# Patient Record
Sex: MALE | Race: WHITE
[De-identification: names, ages, dates, MRNs, and addresses within clinical notes are randomized per-mention and may not be internally consistent; named-entity substitution may affect disease eponyms.]

---

## 2017-02-06 NOTE — DIAGNOSTIC IMAGING REPORT
[facility]

[id number] [facility] [address] [address]

 

 

 

 

Report Submission Date: 2017 3:34:03 PM CST

Patient       Study

Name:   LION CASTILLO       Date:   2017 3:11:22 PM CST

MRN:   [id number]       Modality Type:   CT\SR

Gender:   M       Description:   CT BRAIN W/O CONTRAST

:   [birth year]       Institution:   [facility]

Physician:   MACKENZIE BROOKS            

 

 

Computed tomography of the head without contrast 



HISTORY:   

Gait disturbance 



FINDINGS:   

Transverse brain sections are obtained without contrast revealing marked 
cerebellar and mild to moderate cerebral atrophy.  Localized left parietal and 
marked bifrontal encephalomalacia is observed.  There is no intracranial 
hemorrhage.  The skull is intact. 



IMPRESSION:   



Marked bifrontal and mild-to-moderate left parietal encephalomalacia.   



Global brain atrophy, most severely involving the cerebellum.

 

Electronically signed on 2017 3:34:03 PM CST by:

Liang BARNARD

## 2017-02-06 NOTE — INPATIENT PROGRESS NOTE
Subjective





- Required Recertification Statement


I anticipate X number of days because-include discharge plan: 1 day





- Review of Systems


Events since last encounter: 





Patient is less lethergic then earlier today. Is still having a lot of apraxic 

gait disturbance. No focal weakness noted. Patient does not complain of any 

pain in the leg or hip area. Ability to get a history from patient is 

difficult. 


HEENT: Denies: Head Aches


Pulmonary: Denies: Dyspnea, Cough


Cardiovascular: Denies: Chest Pain, Palpitations


Gastrointestinal: Denies: Nausea, Vomiting, Abdominal Pain, Diarrhea, 

Constipation


Musculoskeletal: Denies: Back Pain


Neurological: Denies: Weakness





Objective





- Exam


Vitals and I&O: 


 Vital Signs











Temp  97.4 F L  02/06/17 04:20


 


Pulse  76   02/06/17 04:20


 


Resp  16   02/06/17 04:20


 


BP  102/64   02/06/17 04:20


 


Pulse Ox  93   02/06/17 04:20











 Intake & Output











 02/05/17 02/06/17 02/06/17





 23:59 11:59 23:59


 


Intake Total  300 


 


Balance  300 


 


Weight  76.204 kg 76.204 kg


 


Intake:   


 


  IV  300 


 


    Left Hand  300 














General: Alert, Oriented to Person, Cooperative.  No: Oriented to Place, 

Oriented to Time


Neck: Supple, No JVD


Lungs: Clear to auscultation, Normal air movement, Speaks full Sentences.  No: 

Respiratory Distress, Wheezes, Rales, Rhonchi


Cardiovascular: Regular rate, Normal S1, Normal S2, No murmurs


Abdomen: Normal bowel sounds, Soft, No tenderness, No hepatospenomegaly, No 

masses


Extremities: No clubbing, No cyanosis, No edema


Skin: Normal, Pink, Warm, Dry


Neurological: Normal speech (at baseline), Strength Equal Bilat, Normal tone, 

Cranial nerves 3-12 NL.  No: Normal gait


Psych/Mental Status: Mental status NL (at baseline.), Mood NL.  No: Intact 

Judgment





- Results


Results: 


 Laboratory Results











WBC  9.66 K/ul (4.00-12.00)   02/06/17  01:40    


 


RBC  4.60 M/ul (3.90-5.20)   02/06/17  01:40    


 


Hgb  15.0 g/dL (12.0-18.0)   02/06/17  01:40    


 


Hct  44.6 % (37.0-53.0)   02/06/17  01:40    


 


MCV  97.0 fl (80.0-100.0)   02/06/17  01:40    


 


MCH  32.6 pg (28.0-34.0)   02/06/17  01:40    


 


MCHC  33.6 g/dL (30.0-36.0)   02/06/17  01:40    


 


RDW  12.5 % (11.3-14.3)   02/06/17  01:40    


 


Plt Count  230 K/mm3 (130-400)   02/06/17  01:40    


 


Neut % (Auto)  82.4 % (39.0-79.0)  H  02/06/17  01:40    


 


Lymph % (Auto)  9.4 % (16.0-50.0)  L  02/06/17  01:40    


 


Mono % (Auto)  7.3 % (0.0-11.0)   02/06/17  01:40    


 


Eos % (Auto)  0.4 % (0.0-6.8)   02/06/17  01:40    


 


Baso % (Auto)  0.1  (0.0-1.5)   02/06/17  01:40    


 


Neut #  8.0 # k/uL (1.4-7.7)  H  02/06/17  01:40    


 


Lymph #  0.9 # k/uL (0.6-4.0)   02/06/17  01:40    


 


Mono #  0.7 # k/uL (0.0-0.9)   02/06/17  01:40    


 


Eos #  0.0 # k/uL (0.0-0.6)   02/06/17  01:40    


 


Baso #  0.0 # k/uL (0.0-0.5)   02/06/17  01:40    


 


Reactive Lymphs %  0.5 % (0.0-5.0)   02/06/17  01:40    


 


Reactive Lymphs #  0.0 # k/uL (0.0-0.8)   02/06/17  01:40    


 


Sodium  139 mmol/L (136-145)   02/06/17  01:40    


 


Potassium  4.9 mmol/L (3.5-5.0)   02/06/17  01:40    


 


Chloride  95 mmol/L ()  L  02/06/17  01:40    


 


Carbon Dioxide  30 mmol/L (20-32)   02/06/17  01:40    


 


BUN  16 mg/dL (10-26)   02/06/17  01:40    


 


Creatinine  1.4 mg/dL (0.4-1.5)   02/06/17  01:40    


 


Estimated Creat Clear  69   02/06/17  01:40    


 


Est GFR ( Amer)  > 60  (60-)  02/06/17  01:40    


 


Est GFR (Non-Af Amer)  56  (60-) L  02/06/17  01:40    


 


Glucose  119 mg/dL (70-99)  H  02/06/17  01:40    


 


Calcium  9.6 mg/dL (8.5-10.5)   02/06/17  01:40    


 


Total Bilirubin  0.4 mg/dL (0.2-1.2)   02/06/17  01:40    


 


AST  28 U/L (0-41)   02/06/17  01:40    


 


ALT  26 U/L (0-45)   02/06/17  01:40    


 


Alkaline Phosphatase  49 U/L ()   02/06/17  01:40    


 


Total Protein  7.1 g/dL (6.0-8.5)   02/06/17  01:40    


 


Albumin  4.2 g/dL (3.0-5.5)   02/06/17  01:40    


 


Urine Color  Yellow  (YELLOW)   02/06/17  01:30    


 


Urine Appearance  Clear  (CLEAR)   02/06/17  01:30    


 


Urine pH  6.0  (5.0 - 8.0)   02/06/17  01:30    


 


Ur Specific Gravity  1.010  (1.010-1.030)   02/06/17  01:30    


 


Urine Protein  Negative mg/dL (NEGATIVE)   02/06/17  01:30    


 


Urine Ketones  Negative mg/dL (NEGATIVE)   02/06/17  01:30    


 


Urine Occult Blood  Negative  (NEGATIVE)   02/06/17  01:30    


 


Urine Nitrite  Negative  (NEGATIVE)   02/06/17  01:30    


 


Urine Bilirubin  Negative  (NEGATIVE)   02/06/17  01:30    


 


Urine Urobilinogen  0.2 Eu (0.2-1.0)   02/06/17  01:30    


 


Ur Leukocyte Esterase  Negative  (NEGATIVE)   02/06/17  01:30    


 


Urine Glucose  Negative mg/dL (NEGATIVE)   02/06/17  01:30    


 


Influenza A (Rapid)  Negative  (NEGATIVE)   02/06/17  00:01    


 


Influenza B (Rapid)  Negative  (NEGATIVE)   02/06/17  00:01    


 


Group A Strep Screen  Positive  (NEGATIVE)  H  02/06/17  01:30    

















Assessment/Plan





- Assessment/Plan


(1) Apraxic gait


Status: Acute   


Assessment: 


will get CT scan of the brain to check for intracranial abnormalities due to 

recent falls








(2) Seizure disorder


Status: Acute   


Assessment: 


Has had two seizures in the past 36 hours, none since admission. Will get 

valproic acid level








(3) Strep pharyngitis


Status: Acute   Comment: under treatment.   





(4) Dehydration


Status: Acute   


Assessment: 


will recheck lytes.

## 2017-02-06 NOTE — ED PHYSICIAN DOCUMENTATION
General Adult





- HISTORIAN


Historian: patient, parent





- HPI


Stated Complaint: slurred speech, unsteady gait


Chief Complaint: General Adult


Onset: hours


Timing: still present


Severity: moderate


Further Comments: yes (Pt is a 55 yo male with MR who lives in a group home and 

has a seizure d/o.  Pt has daily seizures which are usually brief.  Today pt 

had seizure which lasted longer than usual and appeared very weak.  Pt has not 

been eating and has been sleeping excessively and does not appear to have his 

usual energy.  He has chronic difficulty walking, but he appears more unsteady.

  Pt had complained of sore neck.)





- ROS


CONST: other (pt cannot give good ROS, he appears weak, per family, and had 

complained of a sore neck.)





- PAST HX


Past History: other (MR/alice. delay; seizure d/o; chronic difficulty walking.)


Allergies/Adverse Reactions: 


 Allergies











Allergy/AdvReac Type Severity Reaction Status Date / Time


 


phenobarbital Allergy   Verified 02/06/17 02:34














Home Medications: 


 Ambulatory Orders











 Medication  Instructions  Recorded


 


Cholecalciferol (Vitamin D3) [D3 2,000 unit PO D 03/27/13





Dots]  


 


Acetaminophen [Tylenol] 650 mg PO Q4 PRN #60 tablet 02/17/14


 


Divalproex Sodium [Depakote ER] 250 mg PO BID 02/06/17


 


Lacosamide [Vimpat] 200 mg PO BID 02/06/17


 


Lorazepam [Ativan] 2 mg PO QDAY 02/06/17


 


Oxcarbazepine [Trileptal] 300 mg PO TID 02/06/17














- SOCIAL HX


Smoking History: non-smoker





- FAMILY HX


Family History: No





- VITAL SIGNS


Vital Signs: 





 Vital Signs











Temp Pulse Resp BP Pulse Ox


 


    69   18   75/45   92 


 


    02/06/17 00:30  02/06/17 00:30  02/06/17 00:30  02/06/17 00:30














- REVIEWED ASSESSMENTS


Nursing Assessment  Reviewed: Yes


Vitals Reviewed: Yes





Progress





- Progress


Progress: 


BP 85/45





NS 1 L IVF x 2





/56





Rapid Strep - pos





Rocephin 500 mg IV





Admit to Dr. Costa.








ED Results Lab/Radiology





- Lab Results


Lab Results: 





 Lab Results











  02/06/17 02/06/17 02/06/17





  01:40 01:40 00:01


 


WBC    9.66 K/ul K/ul  





    (4.00-12.00)  


 


RBC    4.60 M/ul M/ul  





    (3.90-5.20)  


 


Hgb    15.0 g/dL g/dL  





    (12.0-18.0)  


 


Hct    44.6 % %  





    (37.0-53.0)  


 


MCV    97.0 fl fl  





    (80.0-100.0)  


 


MCH    32.6 pg pg  





    (28.0-34.0)  


 


MCHC    33.6 g/dL g/dL  





    (30.0-36.0)  


 


RDW    12.5 % %  





    (11.3-14.3)  


 


Plt Count    230 K/mm3 K/mm3  





    (130-400)  


 


Neut % (Auto)    82.4 % H %  





    (39.0-79.0)  


 


Lymph % (Auto)    9.4 % L %  





    (16.0-50.0)  


 


Mono % (Auto)    7.3 % %  





    (0.0-11.0)  


 


Eos % (Auto)    0.4 % %  





    (0.0-6.8)  


 


Baso % (Auto)    0.1   





    (0.0-1.5)  


 


Neut #    8.0 # k/uL H # k/uL  





    (1.4-7.7)  


 


Lymph #    0.9 # k/uL # k/uL  





    (0.6-4.0)  


 


Mono #    0.7 # k/uL # k/uL  





    (0.0-0.9)  


 


Eos #    0.0 # k/uL # k/uL  





    (0.0-0.6)  


 


Baso #    0.0 # k/uL # k/uL  





    (0.0-0.5)  


 


Reactive Lymphs %    0.5 % %  





    (0.0-5.0)  


 


Reactive Lymphs #    0.0 # k/uL # k/uL  





    (0.0-0.8)  


 


Sodium  139 mmol/L mmol/L    





   (136-145)   


 


Potassium  4.9 mmol/L mmol/L    





   (3.5-5.0)   


 


Chloride  95 mmol/L L mmol/L    





   ()   


 


Carbon Dioxide  30 mmol/L mmol/L    





   (20-32)   


 


BUN  16 mg/dL mg/dL    





   (10-26)   


 


Creatinine  1.4 mg/dL mg/dL    





   (0.4-1.5)   


 


Estimated Creat Clear  69     





    


 


Est GFR ( Amer)  > 60     





  (60 - )  


 


Est GFR (Non-Af Amer)  56  L     





  (60 - )  


 


Glucose  119 mg/dL H mg/dL    





   (70-99)   


 


Calcium  9.6 mg/dL mg/dL    





   (8.5-10.5)   


 


Total Bilirubin  0.4 mg/dL mg/dL    





   (0.2-1.2)   


 


AST  28 U/L U/L    





   (0-41)   


 


ALT  26 U/L U/L    





   (0-45)   


 


Alkaline Phosphatase  49 U/L U/L    





   ()   


 


Total Protein  7.1 g/dL g/dL    





   (6.0-8.5)   


 


Albumin  4.2 g/dL g/dL    





   (3.0-5.5)   


 


Influenza A (Rapid)      Negative 





     (NEGATIVE) 


 


Influenza B (Rapid)      Negative 





     (NEGATIVE) 














- Orders


Orders: 





 ED Orders











 Category Date Time Status


 


 BLOOD CULTURE Stat Lab  02/06/17 Ordered


 


 CBC/PLATELET/DIFF Routine Lab  02/06/17 01:40 Completed


 


 CMP Routine Lab  02/06/17 01:40 Completed


 


 INFLUENZA A&B Routine Lab  02/06/17 00:01 Completed


 


 INFLUENZA A&B Stat Lab  02/06/17 00:01 Ordered


 


 Rapid Strep [GRP A STREP SCREEN] Stat Lab  02/06/17 Ordered


 


 UA [URINALYSIS] Routine Lab  02/06/17 Ordered


 


 0.9 % Sodium Chloride [Normal Saline] 1,000 ml Med  02/06/17 00:48 Discontinued





 IV Q1H   














General Adult Physical Exam





- PHYSICAL EXAM


GENERAL APPEARANCE: mild distress


EENT: ENT inspection normal


NECK: normal inspection, supple, lymphadenopathy


RESPIRATORY: no resp distress, chest non-tender, breath sounds normal


CVS: reg rate & rhythm, heart sounds normal


ABDOMEN: soft, no organomegaly, normal bowel sounds


BACK: normal inspection


SKIN: warm/dry, normal color


EXTREMITIES: non-tender, no evidence of injury


NEURO: other (baseline mental status)





Discharge


Clincal Impression: 


 Strep pharyngitis, Seizure disorder





Hypotension


Qualifiers:


 Hypotension type: unspecified hypotension type Qualified Code(s): I95.9 - 

Hypotension, unspecified





Home Medications: 


Ambulatory Orders





Cholecalciferol (Vitamin D3) [D3 Dots] 2,000 unit PO D 03/27/13 


Acetaminophen [Tylenol] 650 mg PO Q4 PRN #60 tablet 02/17/14 


Divalproex Sodium [Depakote ER] 250 mg PO BID 02/06/17 


Lacosamide [Vimpat] 200 mg PO BID 02/06/17 


Lorazepam [Ativan] 2 mg PO QDAY 02/06/17 


Oxcarbazepine [Trileptal] 300 mg PO TID 02/06/17 








Condition: Stable


Disposition: 09 ADMITTED AS INPATIENT


Decision to Admit: [id number]


Decision Time: 03:00

## 2017-02-09 NOTE — DIAGNOSTIC IMAGING REPORT
TODD MANN 

[facility]

[address] [facility].[address]. [address]

 

 

 

 

Report Submission Date: 2017 3:14:21 PM CST

Patient       Study

Name:   LION CASTILLO       Date:   2017 2:40:50 PM CST

MRN:   [id number]       Modality Type:   CR

Gender:   M       Description:   LOWER EXTREMITY

:   [birth year]       Institution:   [facility]

Physician:   TODD MANN

         

 

 

3 views of the right foot 



History: RT FOOT, PAIN/ SWELLING X4-5 DAYS 

Findings: Comparison: 2014 



No evidence of acute fracture or dislocation right foot. 

Deformity of the distal fibula is seen related to prior fracture. Mild hallux 
valgus deformity seen 

There is mild soft tissue swelling and at the right ankle. 



Impression: 

No evidence of acute fracture or dislocation of the right foot. Mild soft 
tissue swelling at the right ankle 

Deformity distal fibula due to old fracture 

Mild hallux valgus deformity

 

Electronically signed on 2017 3:14:21 PM CST by:

Taty BARNARD

## 2017-03-27 NOTE — DISCHARGE SUMMARY
Discharge Summary





- Discharge Sumary


History of Present Illness: 





Patient is a 56-year-old white male who is living in a group home related to 

his mental retardation.  Patient does have a history of seizure disorder.  On 

the day of admission patient did have a seizure that lasted longer than his 

usual seizures.  Patient was subsequently brought to the ED for evaluation.  In 

the emergency room patient was found to have a strep pharyngitis.  Patient was 

noted to be hypotensive with a systolic in the 80s.  It was felt that the 

patient was probably dehydrated.  Patient was subsequently admitted to 

observation for further care and evaluation.


Home Medications: 


 Ambulatory Orders











 Medication  Instructions  Recorded


 


Amoxicillin [Amoxil] 500 mg PO TID #30 capsule 02/06/17


 


Cholecalciferol [Vitamin D-3] 2,000 unit PO 0800 02/06/17


 


Citalopram Hydrobromide [Celexa] 20 mg PO HS 02/06/17


 


Divalproex Sodium [Depakote ER] 250 mg PO 14 02/06/17


 


Divalproex Sodium [Depakote ER] 250 mg PO 20 02/06/17


 


Divalproex Sodium [Depakote ER] 500 mg PO 0800 02/06/17


 


LORazepam [Ativan] 1 mg PO 0800 02/06/17


 


LORazepam [Ativan] 2 mg PO 14 02/06/17


 


LORazepam [Ativan] 2 mg PO 1400  tablet 02/06/17


 


Lacosamide [Vimpat] 200 mg PO BID 02/06/17


 


Multivitamin [Tab-A-Jordan] 1 each PO DAILY 02/06/17


 


Oxcarbazepine [Trileptal] 300 mg PO TID 02/06/17











Consultations this Visit: None


Procedures this Visit: Other (Head CT)


Allergies/Adverse Reactions: 


 Allergies











Allergy/AdvReac Type Severity Reaction Status Date / Time


 


phenobarbital Allergy   Verified 02/06/17 02:34











Discharge Summary: 





Patient remained stable during his hospital stay.  Patient did not have any 

further seizures.  Patient was started on IV fluids of normal saline and 

rehydrated.  Patient's hypotension improved with rehydration.  At the time of 

dismissal patient blood pressure was 122/60.  Patient continued to have a 

somewhat apraxic gait.  CT scan of the head was done and did not show any acute 

abnormalities.  At the time of dismissal patient was stable and was felt that 

he could be followed up on an outpatient basis and patient was subsequently 

discharged in stable condition.





- Final Diagnosis


(1) Apraxic gait


Problems: 


improved








(2) Seizure disorder


Problems: 


stable, continue home meds








(3) Strep pharyngitis


Problems: 


treat with antibiotics








(4) Dehydration


Problems: 


encourage fluids

## 2018-05-27 ENCOUNTER — HOSPITAL ENCOUNTER (EMERGENCY)
Dept: HOSPITAL 44 - ED | Age: 58
Discharge: HOME | End: 2018-05-27
Payer: COMMERCIAL

## 2018-05-27 VITALS
SYSTOLIC BLOOD PRESSURE: 123 MMHG | DIASTOLIC BLOOD PRESSURE: 67 MMHG | SYSTOLIC BLOOD PRESSURE: 123 MMHG | DIASTOLIC BLOOD PRESSURE: 67 MMHG
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
## 2018-05-27 DIAGNOSIS — S01.81XA: Primary | ICD-10-CM
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
## 2018-05-27 PROCEDURE — 12011 RPR F/E/E/N/L/M 2.5 CM/<: CPT

## 2018-05-27 NOTE — ED PHYSICIAN DOCUMENTATION
General Adult





- HISTORIAN


Historian: parent (Mom and Dad)





- HPI


Stated Complaint: LAC


Chief Complaint: Laceration/Recheck/Suture


Additional Information: 





Per mom and dad patient has a chronic history of seizures in which he is at an 

assisted living facility- patient also wears protective head gear d/t to 

frequent seizures.  Mom states that patient spends every other weekend with 

them.  This morning around 10 a.m. they heard noise coming from patients room 

and he was having a seizure and hit his head (he was sleeping so he did not 

have helmet on)- mom states that she cleaned it up and steri-stripped it.  They 

decided to have it looked at just it case it needed stitches.  Parents state 

his seizure was typical and not abnormal.


Onset: hours (around 10 a.m.)


Timing: better


Severity: other (no acute distress- patient is pleasant)


Modifying Factors: seizure


Quality: seizure- normal characteristic for patient





- ROS


CONST: no problems


EYES/ENT: none


CVS/RESP: none


GI/: none


MS/SKIN/LYMPH: other (2 cm laceration to the right brow- superficial)


NEURO/PSYCH: denies: headache, difficulty walking





- PAST HX


Past History: none


Other History: seizure disorder


Surgeries/Procedures: other (vagus nerve stimulator)


Immunizations: tetanus, UTD


Allergies/Adverse Reactions: 


 Allergies











Allergy/AdvReac Type Severity Reaction Status Date / Time


 


phenobarbital Allergy   Verified 05/27/18 15:37














Home Medications: 


 Ambulatory Orders











 Medication  Instructions  Recorded


 


Amoxicillin [Amoxil] 500 mg PO TID #30 capsule 02/06/17


 


Cholecalciferol [Vitamin D-3] 2,000 unit PO 0800 02/06/17


 


Citalopram Hydrobromide [Celexa] 20 mg PO HS 02/06/17


 


Divalproex Sodium [Depakote ER] 250 mg PO 14 02/06/17


 


Divalproex Sodium [Depakote ER] 250 mg PO 20 02/06/17


 


Divalproex Sodium [Depakote ER] 500 mg PO 0800 02/06/17


 


LORazepam [Ativan] 1 mg PO 0800 02/06/17


 


LORazepam [Ativan] 2 mg PO 14 02/06/17


 


LORazepam [Ativan] 2 mg PO 1400  tablet 02/06/17


 


Lacosamide [Vimpat] 200 mg PO BID 02/06/17


 


Multivitamin [Tab-A-Jordan] 1 each PO DAILY 02/06/17


 


Oxcarbazepine [Trileptal] 300 mg PO TID 02/06/17














- SOCIAL HX


Smoking History: non-smoker


Alcohol Use: none


Drug Use: none





- FAMILY HX


Family History: No





- VITAL SIGNS


Vital Signs: 





 Vital Signs











Temp Pulse Resp BP Pulse Ox


 


 97.8 F   87   20   123/67   100 


 


 05/27/18 15:32  05/27/18 15:32  05/27/18 15:32  05/27/18 15:32  05/27/18 15:32














- REVIEWED ASSESSMENTS


Nursing Assessment  Reviewed: Yes


Vitals Reviewed: Yes





Procedures


Wound Location: face


Wound Length: 2cm


Wound's Depth, Shape: superficial


Wound Explored: no foreign body removed


Betadine Prep?: Yes (and chlorhexidine)


Wound Repaired With: Dermabond





ED Results Lab/Radiology





- Orders


Orders: 





 ED Orders











 Category Date Time Status


 


 Skin Adhesive NOW Care  05/27/18 15:45 Ordered














General Adult Physical Exam





- PHYSICAL EXAM


GENERAL APPEARANCE: no distress


EENT: eye inspection normal, ENT inspection normal, LUCILLE


NECK: normal inspection


RESPIRATORY: no resp distress, breath sounds normal


CVS: reg rate & rhythm, heart sounds normal, equal pulses, no murmur


ABDOMEN: soft, normal bowel sounds, no distension


BACK: normal inspection


SKIN: warm/dry, normal color, other (2 cm superficial laceration to the right 

brow)


EXTREMITIES: non-tender, normal range of motion, no evidence of injury


NEURO: oriented X3 (oriented per disability), CN's nml as tested, sensation nml





Discharge


Clincal Impression: 


 Laceration





Referrals: 


Dwayne Benson MD [Primary Care Provider] - 2 Days


Additional Instructions: 


Instructions on Laceration care and skin adhesive sent with parents


Script to use bike helmet given to parents to keep from irritating lacerated 

area on the face


Keep area clean and dry- 


Adhesive will eventually flake off as abrasion heals.  


Try to keep patient from rubbing affected area.


Condition: Good


Disposition: 01 HOME, SELF-CARE


Decision to Admit: NO


Decision Time: 15:57

## 2018-09-12 ENCOUNTER — HOSPITAL ENCOUNTER (OUTPATIENT)
Dept: HOSPITAL 44 - LAB | Age: 58
End: 2018-09-12
Attending: FAMILY MEDICINE
Payer: COMMERCIAL

## 2018-09-12 DIAGNOSIS — G40.909: Primary | ICD-10-CM

## 2018-09-12 DIAGNOSIS — Z51.81: ICD-10-CM

## 2018-09-12 LAB
PROT SERPL-MCNC: 7 G/DL (ref 6–8.5)
VALPROATE SERPL-MCNC: 47 UG/ML (ref 50–100)

## 2018-09-12 PROCEDURE — 80164 ASSAY DIPROPYLACETIC ACD TOT: CPT

## 2018-09-12 PROCEDURE — 80053 COMPREHEN METABOLIC PANEL: CPT

## 2018-09-12 PROCEDURE — 80299 QUANTITATIVE ASSAY DRUG: CPT

## 2019-08-05 ENCOUNTER — HOSPITAL ENCOUNTER (EMERGENCY)
Dept: HOSPITAL 44 - ED | Age: 59
Discharge: HOME | End: 2019-08-05
Payer: COMMERCIAL

## 2019-08-05 VITALS
DIASTOLIC BLOOD PRESSURE: 74 MMHG | DIASTOLIC BLOOD PRESSURE: 74 MMHG | SYSTOLIC BLOOD PRESSURE: 118 MMHG | SYSTOLIC BLOOD PRESSURE: 118 MMHG
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
## 2019-08-05 DIAGNOSIS — S01.91XA: Primary | ICD-10-CM

## 2019-08-05 PROCEDURE — 70450 CT HEAD/BRAIN W/O DYE: CPT

## 2019-08-05 PROCEDURE — 99283 EMERGENCY DEPT VISIT LOW MDM: CPT

## 2019-08-05 PROCEDURE — 12002 RPR S/N/AX/GEN/TRNK2.6-7.5CM: CPT

## 2019-08-05 NOTE — DIAGNOSTIC IMAGING REPORT
SHAHEEN TARANGO 

[facility]

[address] [facility] [address]

[place], [place]. [address]

 

 

 

 

Report Submission Date: Aug 5, 2019 2:56:25 PM CDT

Patient       Study

Name:   DAJUAN CASTILLO       Date:   Aug 5, 2019 2:40:28 PM CDT

MRN:   [id number]       Modality Type:   CT\SR

Gender:   M       Description:   CT BRAIN W/O CONTRAST

:   [birth year]       Institution:   [facility]

Physician:   SHAHEEN TARANGO

     Accession:    [id number]

 

 

CT brain without IV contrast 

Clinical history: Trauma 

Radiation dose DLP 1232 



Mastoid air cells and visible sinuses are clear. No visible skull fractures. 
Moderate cortical atrophy with demyelinating process in the frontal lobes 
bilaterally. Prominent ventricles. 

No midline shift. No intracranial bleed acute process. 



Impression: 

Atrophy more pronounced on the frontal lobes . 

Dilated ventricles cannot exclude normal pressure hydrocephalus . 

No prior films available and there is no visible bleeding or acute intracranial 
process.

 

Electronically signed on Aug 5, 2019 2:56:25 PM CDT by:

[place] BARNARD

## 2019-08-05 NOTE — ED PHYSICIAN DOCUMENTATION
General Adult





- HISTORIAN


Historian: patient





- HPI


Stated Complaint: seizure and fall (head lac) 


Chief Complaint: Fall


Onset: hours (1)


Timing: still present


Severity: mild


Further Comments: yes (Per mom he was brushing his teeth so he did not have his 

helmet on yet so when he had his seizure (which is no new to him) he fell and 

did strike his head on the floor where he did get a cut on the left side of his 

head. Per his mom he is "acting just fine" she states "he doesnt need a bunch of

xrays or tests" she just wants to know if the laceration needs stiches - she 

states she does not want him to have a CT and she does not want to refuse she is

not sure what to do with the CT or care.)





- ROS


CONST: no problems





- PAST HX


Past History: other (seizures )


Immunizations: UTD


Allergies/Adverse Reactions: 


                                    Allergies











Allergy/AdvReac Type Severity Reaction Status Date / Time


 


phenobarbital Allergy   Verified 08/05/19 14:19














Home Medications: 


                                Ambulatory Orders











 Medication  Instructions  Recorded


 


Cholecalciferol [Vitamin D-3] 2,000 unit PO 0800 02/06/17


 


Citalopram Hydrobromide [Celexa] 20 mg PO HS 02/06/17


 


Divalproex Sodium [Depakote ER] 250 mg PO 14 02/06/17


 


Divalproex Sodium [Depakote ER] 250 mg PO 20 02/06/17


 


Divalproex Sodium [Depakote ER] 500 mg PO 0800 02/06/17


 


LORazepam [Ativan] 1 mg PO 0800 02/06/17


 


LORazepam [Ativan] 2 mg PO 14 02/06/17


 


Lacosamide [Vimpat] 200 mg PO BID 02/06/17


 


Multivitamin [Tab-A-Jordan] 1 each PO DAILY 02/06/17


 


Oxcarbazepine [Trileptal] 300 mg PO TID 02/06/17














- SOCIAL HX


Smoking History: non-smoker


Alcohol Use: none


Drug Use: none





- FAMILY HX


Family History: No





- VITAL SIGNS


Vital Signs: 


                                   Vital Signs











Temp Pulse Resp BP Pulse Ox


 


 98.2 F   82   19   121/77   94 


 


 08/05/19 13:54  08/05/19 13:54  08/05/19 13:54  08/05/19 13:54  08/05/19 13:54














- REVIEWED ASSESSMENTS


Nursing Assessment  Reviewed: Yes


Vitals Reviewed: Yes





Procedures


Wound Location: head


Wound Length: 4 cm 


Wound's Depth, Shape: linear


Wound Explored: no foreign body removed


Wound Repaired With: Dermabond





Progress





- Progress


Progress: 





1415: she states he cannot have a CT due to a stimulator to prevent seizures - 

this was discussed. She states she has no idea what to do she just wants sutures

 or staples if he needs them. DG


1425: she states she is wanting to give him his daily meds - explained I need to

 see CT results before oral intake is given DG


1430: She states to the [facility] for CT that she is refusing the CT and he is 

in the bathroom with her DG


1445: She then comes out with pt and states she wants him "all the way treated" 

- asked again if she want CT she states yes she wants the CT and she did not 

want to sign the refusal form DG


1450: Did escort the pt to the CT scanner and he tolerated well. DG 


1500: discussed findings with mom - area was cleaned and dermabond placed. He 

tolerated well DG





ED Results Lab/Radiology





- Orders


Orders: 


                                    ED Orders











 Category Date Time Status


 


 Cleanse with NS and Chlorhexid 1T Care  08/05/19 14:07 Active


 


 CT BRAIN W/O CONTRAST Stat Exams  08/05/19 Completed














General Adult Physical Exam





- PHYSICAL EXAM


GENERAL APPEARANCE: no distress


EENT: eye inspection normal, no signs of dehydration


NECK: normal inspection


RESPIRATORY: no resp distress, chest non-tender, breath sounds normal


CVS: reg rate & rhythm, heart sounds normal


ABDOMEN: soft, no distension


BACK: normal inspection


SKIN: warm/dry, other (left side of scalp 4 cm laceration linear superficial )


EXTREMITIES: non-tender, normal range of motion, no evidence of injury


NEURO: cognition normal (per mom )





Discharge


Clincal Impression: 


 Laceration





Referrals: 


Dwayne Benson MD [Primary Care Provider] - 2 Days


Comments: 





1. DO NOT PICK at the area glued


2. Keep area clean and dry 


3. Follow up with PCP In 2 days


4. Return to ER for any increasing concerns 


Condition: Stable


Disposition: 01 HOME, SELF-CARE


Decision to Admit: NO


Date of Decison to Admit: 08/05/19


Decision Time: 15:08

## 2019-10-30 ENCOUNTER — HOSPITAL ENCOUNTER (OUTPATIENT)
Dept: HOSPITAL 44 - LAB | Age: 59
End: 2019-10-30
Attending: FAMILY MEDICINE
Payer: COMMERCIAL

## 2019-10-30 DIAGNOSIS — G40.909: Primary | ICD-10-CM

## 2019-10-30 LAB
BASOPHILS NFR BLD: 0.6 % (ref 0–1.5)
EGFR (NON-AFRICAN): > 60
MCV RBC AUTO: 96 FL (ref 80–100)
NEUTROPHILS #: 3.6 # K/UL (ref 1.4–7.7)

## 2019-10-30 PROCEDURE — 85025 COMPLETE CBC W/AUTO DIFF WBC: CPT

## 2019-10-30 PROCEDURE — 80299 QUANTITATIVE ASSAY DRUG: CPT

## 2019-10-30 PROCEDURE — 36415 COLL VENOUS BLD VENIPUNCTURE: CPT

## 2019-10-30 PROCEDURE — 80164 ASSAY DIPROPYLACETIC ACD TOT: CPT

## 2019-10-30 PROCEDURE — 80053 COMPREHEN METABOLIC PANEL: CPT
